# Patient Record
Sex: MALE | Race: WHITE | NOT HISPANIC OR LATINO | Employment: STUDENT | URBAN - METROPOLITAN AREA
[De-identification: names, ages, dates, MRNs, and addresses within clinical notes are randomized per-mention and may not be internally consistent; named-entity substitution may affect disease eponyms.]

---

## 2022-09-15 ENCOUNTER — EVALUATION (OUTPATIENT)
Dept: PHYSICAL THERAPY | Facility: REHABILITATION | Age: 22
End: 2022-09-15
Payer: COMMERCIAL

## 2022-09-15 DIAGNOSIS — M54.42 CHRONIC BILATERAL LOW BACK PAIN WITH BILATERAL SCIATICA: Primary | ICD-10-CM

## 2022-09-15 DIAGNOSIS — G89.29 CHRONIC BILATERAL LOW BACK PAIN WITH BILATERAL SCIATICA: Primary | ICD-10-CM

## 2022-09-15 DIAGNOSIS — M54.41 CHRONIC BILATERAL LOW BACK PAIN WITH BILATERAL SCIATICA: Primary | ICD-10-CM

## 2022-09-15 PROCEDURE — 97162 PT EVAL MOD COMPLEX 30 MIN: CPT | Performed by: PHYSICAL THERAPIST

## 2022-09-15 PROCEDURE — 97112 NEUROMUSCULAR REEDUCATION: CPT | Performed by: PHYSICAL THERAPIST

## 2022-09-15 RX ORDER — MULTIVITAMIN
1 CAPSULE ORAL DAILY
COMMUNITY

## 2022-09-15 NOTE — LETTER
September 15, 2022    Deanne Johnson  [address] [facility] [address]    Patient: Lee Johnston   YOB: 2000   Date of Visit: 9/15/2022     Encounter Diagnosis     ICD-10-CM    1  Chronic bilateral low back pain with bilateral sciatica  M54 42     M54 41     G89 29        Dear Dr Bernie Villagran: Thank you for your recent referral of Lee Johnston  Please review the attached evaluation summary from Juan's recent visit  Please verify that you agree with the plan of care by signing the attached order  If you have any questions or concerns, please do not hesitate to call  I sincerely appreciate the opportunity to share in the care of one of your patients and hope to have another opportunity to work with you in the near future  Sincerely,    Thomas Estevez, PT      Referring Provider:      I certify that I have read the below Plan of Care and certify the need for these services furnished under this plan of treatment while under my care  Deanne Johnson  [address] [place] [address]  Via Fax: [phone]          PT Evaluation     Today's date: 9/15/2022  Patient name: Lee Johnston  : 2000  MRN: [id number]  Referring provider: Edil Seals  Dx:   Encounter Diagnosis     ICD-10-CM    1  Chronic bilateral low back pain with bilateral sciatica  M54 42     M54 41     G89 29        Start Time: 1200  Stop Time: 1300  Total time in clinic (min): 60 minutes    Assessment  Assessment details: Lee Johnston is a pleasant 25 y o  male who presents with chronic low back pain and episod es of LE numbness  The patient's greatest concerns are determining the cause of nerve symptoms and preventing future episodes of back pain  No further referral appears necessary at this time based upon examination results  Primary movement impairment diagnosis of increased nerve tension, trunk neuromuscular control deficits   Patient's parasthesias appear to be caused by nerve tension related to soft tissue restriction rather than lumbar spine origin  Patient's low back pain is likely related to lumbopelvic stability deficits and poor posterior chain recruitment while lifting  There is no indication of UMN lesion or other red flags  Pt  will benefit from skilled PT services that includes manual therapy techniques to enhance tissue extensibility, neuromuscular re-education to facilitate motor control, therapeutic exercise to increase functional mobility, and modalities prn to reduce pain and inflammation  Primary Impairments:  1) increased nerve tension  2) trunk neuromuscular control deficit      Impairments: abnormal muscle firing, abnormal muscle tone, abnormal or restricted ROM, abnormal movement, activity intolerance, impaired physical strength, lacks appropriate home exercise program, pain with function, poor posture  and poor body mechanics    Symptom irritability: lowUnderstanding of Dx/Px/POC: excellentPrognosis details: Positive prognostic indicators include positive attitude toward recovery, good understanding of POC  Goals  Impairment based goals  Patient tolerate long sitting for 2 min without symptoms  Patient will achieve <20deg for 90/90 test      Function based goals  Patient will be independent in comprehensive HEP upon discharge  Patient will achieve goal FOTO score upon discharge  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  Patient will be able to tolerate sitting position of comfort  Plan  Plan details: Based on school schedule patient will primarily be completing an HEP    Patient would benefit from: skilled physical therapy  Planned therapy interventions: activity modification, joint mobilization, manual therapy, motor coordination training, neuromuscular re-education, patient education, self care, therapeutic activities, therapeutic exercise, graded activity, home exercise program, behavior modification, graded exercise, functional ROM exercises and strengthening  Frequency: 1x week  Duration in visits: 15  Treatment plan discussed with: patient        Subjective Evaluation    History of Present Illness  Mechanism of injury: Began to experience low back pain and LE parasthesias that started 4 years ago  Sitting cross legged or with legs straight, legs will "fall asleep"  This summer he was trying to kayak and was unable to due to a rapid onset of LE numbness  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 experience low back pain with heavy lifting involving some of that equipment  His back pain will typically resolve in approx 12-24 hours, and Tylenol usually helps  His primary concern is determining the primary cause of the his leg numbness  Pain  Current pain ratin  At best pain ratin  At worst pain ratin  Quality: dull ache ("numbness")  Aggravating factors: lifting and sitting      Diagnostic Tests  X-ray: normal  Patient Goals  Patient goals for therapy: increased strength, return to sport/leisure activities, independence with ADLs/IADLs, increased motion and decreased pain  Patient goal: Decrease frequency of numbness        Objective     General Comments:      Lumbar Comments  Gait: WNL    Standing Posture: WNL    Red Flags: unremarkable    Hip hinge: poor posterior chain recruitment; excessive lumbar flexion    Lower Quarter Screen:   Reflexes: brisk B/L patellar  Dermatomes: intact B/L  Myotomes: intact B/L  Ankle clonus: absent B/L    Long sittin min to elicit symptoms  Figure 4 sittin min to elicit symptoms: more mild    Manual Muscle Testing:   Hip Flexion:  R  4/5  L  4/5   Hip Extension: R  3-/5  L 3-/5     Range of Motion:   Lumbar Spine Flexion: WNL  Lumbar Spine Extension: WNL  Lumbar Spine Sidebending:  WNL B/L    Special Tests   Hip Scour: neg  PANDA: neg  SI Compression: neg  Passive SLR: pos B/L  Slump Test: pos B/L   90/90 test: pos B/L    Repeated Movements: no change              Precautions: none      Manuals 9/15                                       Neuro Re-Ed        Cathy curl up 10x; HEP       Prone alt UE/LE raise 10x ea; HEP       Bridge w/ TrA 20x; HEP       SL RDL 3x5; 5s; HEP       Palloff press  *      Lifts  *                      Education HEP and POC       Ther Ex        Slump slider flossing 10x; HEP                                                               Ther Activity                        Gait Training                        Modalities

## 2022-09-15 NOTE — LETTER
September 15, 2022    Deedee Joseph  [address] [facility] [address]    Patient: Pop Edouard   YOB: 2000   Date of Visit: 9/15/2022     Encounter Diagnosis     ICD-10-CM    1  Chronic bilateral low back pain with bilateral sciatica  M54 42     M54 41     G89 29        Dear Dr Can Arce Recipients: Thank you for your recent referral of Pop Edouard  Please review the attached evaluation summary from Juan's recent visit  Please verify that you agree with the plan of care by signing the attached order  If you have any questions or concerns, please do not hesitate to call  I sincerely appreciate the opportunity to share in the care of one of your patients and hope to have another opportunity to work with you in the near future  Sincerely,    Germain Barber, PT      Referring Provider:      I certify that I have read the below Plan of Care and certify the need for these services furnished under this plan of treatment while under my care  Deedee Joseph  [address] [place] [address]  Via Fax: [phone]          PT Evaluation     Today's date: 9/15/2022  Patient name: Pop Edouard  : 2000  MRN: [id number]  Referring provider: Stevie Linares  Dx:   Encounter Diagnosis     ICD-10-CM    1  Chronic bilateral low back pain with bilateral sciatica  M54 42     M54 41     G89 29        Start Time: 1200  Stop Time: 1300  Total time in clinic (min): 60 minutes    Assessment  Assessment details: Pop Edouard is a pleasant 25 y o  male who presents with chronic low back pain and episod es of LE numbness  The patient's greatest concerns are determining the cause of nerve symptoms and preventing future episodes of back pain  No further referral appears necessary at this time based upon examination results  Primary movement impairment diagnosis of increased nerve tension, trunk neuromuscular control deficits   Patient's parasthesias appear to be caused by nerve tension related to soft tissue restriction rather than lumbar spine origin  Patient's low back pain is likely related to lumbopelvic stability deficits and poor posterior chain recruitment while lifting  There is no indication of UMN lesion or other red flags  Pt  will benefit from skilled PT services that includes manual therapy techniques to enhance tissue extensibility, neuromuscular re-education to facilitate motor control, therapeutic exercise to increase functional mobility, and modalities prn to reduce pain and inflammation  Primary Impairments:  1) increased nerve tension  2) trunk neuromuscular control deficit      Impairments: abnormal muscle firing, abnormal muscle tone, abnormal or restricted ROM, abnormal movement, activity intolerance, impaired physical strength, lacks appropriate home exercise program, pain with function, poor posture  and poor body mechanics    Symptom irritability: lowUnderstanding of Dx/Px/POC: excellentPrognosis details: Positive prognostic indicators include positive attitude toward recovery, good understanding of POC  Goals  Impairment based goals  Patient tolerate long sitting for 2 min without symptoms  Patient will achieve <20deg for 90/90 test      Function based goals  Patient will be independent in comprehensive HEP upon discharge  Patient will achieve goal FOTO score upon discharge  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  Patient will be able to tolerate sitting position of comfort  Plan  Plan details: Based on school schedule patient will primarily be completing an HEP    Patient would benefit from: skilled physical therapy  Planned therapy interventions: activity modification, joint mobilization, manual therapy, motor coordination training, neuromuscular re-education, patient education, self care, therapeutic activities, therapeutic exercise, graded activity, home exercise program, behavior modification, graded exercise, functional ROM exercises and strengthening  Frequency: 1x week  Duration in visits: 15  Treatment plan discussed with: patient        Subjective Evaluation    History of Present Illness  Mechanism of injury: Began to experience low back pain and LE parasthesias that started 4 years ago  Sitting cross legged or with legs straight, legs will "fall asleep"  This summer he was trying to kayak and was unable to due to a rapid onset of LE numbness  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 with heavy lifting involving some of that equipment  His back pain will typically resolve in approx 12-24 hours, and Tylenol usually helps  His primary concern is determining the primary cause of the his leg numbness  Pain  Current pain ratin  At best pain ratin  At worst pain ratin  Quality: dull ache ("numbness")  Aggravating factors: lifting and sitting      Diagnostic Tests  X-ray: normal  Patient Goals  Patient goals for therapy: increased strength, return to sport/leisure activities, independence with ADLs/IADLs, increased motion and decreased pain  Patient goal: Decrease frequency of numbness        Objective     General Comments:      Lumbar Comments  Gait: WNL    Standing Posture: WNL    Red Flags: unremarkable    Hip hinge: poor posterior chain recruitment; excessive lumbar flexion    Lower Quarter Screen:   Reflexes: brisk B/L patellar  Dermatomes: intact B/L  Myotomes: intact B/L  Ankle clonus: absent B/L    Long sittin min to elicit symptoms  Figure 4 sittin min to elicit symptoms: more mild    Manual Muscle Testing:   Hip Flexion:  R  4/5  L  4/5   Hip Extension: R  3-/5  L 3-/5     Range of Motion:   Lumbar Spine Flexion: WNL  Lumbar Spine Extension: WNL  Lumbar Spine Sidebending:  WNL B/L    Special Tests   Hip Scour: neg  PANDA: neg  SI Compression: neg  Passive SLR: pos B/L  Slump Test: pos B/L   90/90 test: pos B/L    Repeated Movements: no change              Precautions: none      Manuals 9/15                                       Neuro Re-Ed        Cathy curl up 10x; HEP       Prone alt UE/LE raise 10x ea; HEP       Bridge w/ TrA 20x; HEP       SL RDL 3x5; 5s; HEP       Palloff press  *      Lifts  *                      Education HEP and POC       Ther Ex        Slump slider flossing 10x; HEP                                                               Ther Activity                        Gait Training                        Modalities

## 2022-09-29 ENCOUNTER — EVALUATION (OUTPATIENT)
Dept: PHYSICAL THERAPY | Facility: REHABILITATION | Age: 22
End: 2022-09-29
Payer: COMMERCIAL

## 2022-09-29 DIAGNOSIS — M54.42 CHRONIC BILATERAL LOW BACK PAIN WITH BILATERAL SCIATICA: Primary | ICD-10-CM

## 2022-09-29 DIAGNOSIS — M54.41 CHRONIC BILATERAL LOW BACK PAIN WITH BILATERAL SCIATICA: Primary | ICD-10-CM

## 2022-09-29 DIAGNOSIS — G89.29 CHRONIC BILATERAL LOW BACK PAIN WITH BILATERAL SCIATICA: Primary | ICD-10-CM

## 2022-09-29 PROCEDURE — 97112 NEUROMUSCULAR REEDUCATION: CPT | Performed by: PHYSICAL THERAPIST

## 2022-09-29 NOTE — PROGRESS NOTES
Daily Note     Today's date: 2022  Patient name: Elsa Mayes  : 2000  MRN: [id number]  Referring provider: Rosa Maria Navarro  Dx:   Encounter Diagnosis     ICD-10-CM    1  Chronic bilateral low back pain with bilateral sciatica  M54 42     M54 41     G89 29                   Subjective: Patient has been compliant with HEP  He reports that he has not had any episodes of back pain since last visit, but he hasn't done anything that would typically provoke his back pain (heavy lifting)  Objective: See treatment diary below      Assessment: Updated HEP to increase difficulty of lumbopelvic stability interventions with appropriate response  Patient will follow up in 2 weeks to assess his response to updated HEP and address any back pain flare ups  Plan: Continue per plan of care        Precautions: none      Manuals 9/15 9/29                                      Neuro Re-Ed        Cathy curl up 10x; HEP       Prone alt UE/LE raise 10x ea; HEP       Bridge w/  20x; HEP       SL RDL 3x5; 5s; HEP       Side plank on knees  2x10; HEP      Reverse hyper  2x10; HEP      Bridge / march  2x10; HEP              Education HEP and POC HEP updates      Ther Ex        Slump slider flossing 10x; HEP 10x                                                              Ther Activity                        Gait Training                        Modalities

## 2022-10-13 ENCOUNTER — OFFICE VISIT (OUTPATIENT)
Dept: PHYSICAL THERAPY | Facility: REHABILITATION | Age: 22
End: 2022-10-13
Payer: COMMERCIAL

## 2022-10-13 DIAGNOSIS — M54.42 CHRONIC BILATERAL LOW BACK PAIN WITH BILATERAL SCIATICA: Primary | ICD-10-CM

## 2022-10-13 DIAGNOSIS — M54.41 CHRONIC BILATERAL LOW BACK PAIN WITH BILATERAL SCIATICA: Primary | ICD-10-CM

## 2022-10-13 DIAGNOSIS — G89.29 CHRONIC BILATERAL LOW BACK PAIN WITH BILATERAL SCIATICA: Primary | ICD-10-CM

## 2022-10-13 PROCEDURE — 97140 MANUAL THERAPY 1/> REGIONS: CPT | Performed by: PHYSICAL THERAPIST

## 2022-10-13 PROCEDURE — 97112 NEUROMUSCULAR REEDUCATION: CPT | Performed by: PHYSICAL THERAPIST

## 2022-10-13 NOTE — PROGRESS NOTES
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 [employer]  He had back pain directly afterwards, which resolved on its own in a few hours  Pain: 0/10      Objective: See treatment diary below    Long sitting: 3 min to symptoms     Manual Muscle Testing:   Hip Flexion:  R  4/5  L  4/5   Hip Extension: R  4/5  L 4/5     Special Tests    90/90 test: pos B/L      Assessment: Sherrod Angelucci has been compliant with attending PT and home exercise program since initial eval   Chandra Randleer  has made improvements in objective data since initial evalulation and has achieved most goals  Patient reports having returned to their prior level or function  Patient provided with updated Home Exercise Program, all questions answered, verbalized understanding and agreement to plan of care  Thus it was mutually decided to discontinue this episode of care and transition to Home Exercise Program       Goals  Impairment based goals  Patient tolerate long sitting for 2 min without symptoms  Complete  Patient will achieve <20deg for 90/90 test  Not  met    Function based goals  Patient will be independent in comprehensive HEP upon discharge  Complete  Patient will achieve goal FOTO score upon discharge  Complete  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  Complete  Patient will be able to tolerate sitting position of comfort  Complete      Plan: Continue per plan of care        Precautions: none      Manuals 9/15 9/29 10/13                             Physical exam   TB     Neuro Re-Ed        Cathy curl up 10x; HEP       Prone alt UE/LE raise 10x ea; HEP       Bridge w/ TrA 20x; HEP       SL RDL 3x5; 5s; HEP       Side plank on knees  2x10; HEP      Reverse hyper  2x10; HEP      Bridge w/ march  2x10; HEP              Education HEP and POC HEP updates HEP maintenance      Ther Ex        Slump slider flossing 10x; HEP 10x                                                              Ther Activity                        Gait Training                        Modalities